# Patient Record
Sex: FEMALE | Race: AMERICAN INDIAN OR ALASKA NATIVE
[De-identification: names, ages, dates, MRNs, and addresses within clinical notes are randomized per-mention and may not be internally consistent; named-entity substitution may affect disease eponyms.]

---

## 2017-04-12 ENCOUNTER — HOSPITAL ENCOUNTER (OUTPATIENT)
Dept: HOSPITAL 5 - SPVWC | Age: 52
Discharge: HOME | End: 2017-04-12
Attending: OBSTETRICS & GYNECOLOGY
Payer: OTHER GOVERNMENT

## 2017-04-12 DIAGNOSIS — Z12.31: Primary | ICD-10-CM

## 2017-04-12 PROCEDURE — G0202 SCR MAMMO BI INCL CAD: HCPCS

## 2017-04-12 PROCEDURE — 77067 SCR MAMMO BI INCL CAD: CPT

## 2017-04-12 NOTE — MAMMOGRAPHY REPORT
BILATERAL DIGITAL SCREENING MAMMOGRAM with CAD :04/12/17 13:04:00



CLINICAL: Routine screening.



COMPARISON:03/29/16



FINDINGS: The breasts are heterogeneously dense and extremely dense, 

which lowers the sensitivity of mammography.  No mass, architectural 

distortion or suspicious calcifications.



IMPRESSION: No mammographic evidence of malignancy.



BI-RADS CATEGORY:  2 -- Benign





RECOMMENDATION: Routine mammographic screening in one year.





ACR BI-RADS MAMMOGRAPHIC CODES:

0 = Needs additional imaging evaluation; 1 = Negative; 2 = Benign; 3 = 

Probably benign; 4 = Suspicious; 5 = Malignant; 6 = Known biopsy-proven 

malignancy



COMMENT:

      1.   Dense breast tissue, i.e., adenosis, fibrocystic 

            changes, etc., may obscure an underlying neoplasm.

      2.   Approximately 10% of cancers are not detected with

            mammography.

      3.   A negative mammography report should not delay biopsy 

            if a clinically suspicious mass is present.



Patient follow-up letters are generated by our [employer] application.

## 2018-04-23 ENCOUNTER — HOSPITAL ENCOUNTER (OUTPATIENT)
Dept: HOSPITAL 5 - SPVWC | Age: 53
Discharge: HOME | End: 2018-04-23
Attending: OBSTETRICS & GYNECOLOGY
Payer: OTHER GOVERNMENT

## 2018-04-23 DIAGNOSIS — Z12.31: Primary | ICD-10-CM

## 2018-04-23 PROCEDURE — 77067 SCR MAMMO BI INCL CAD: CPT

## 2018-04-24 NOTE — MAMMOGRAPHY REPORT
BILATERAL DIGITAL SCREENING MAMMOGRAM with CAD: 04/23/18 09:46:00



CLINICAL: Routine screening.



COMPARISON:04/12/17



FINDINGS: The breasts are heterogeneously dense, which may obscure 

small masses.  A right inner asymmetry on the CC view requires 

additional imaging.No architectural distortion or suspicious 

calcifications.The left breast is negative.



IMPRESSION: Right asymmetry requiring further workup.



BI-RADS CATEGORY:  0 -- Additional Imaging Evaluation Required



RECOMMENDATION: Recall for right lateralmedial and spot magnification 

CC views and right breast ultrasound if needed.



ACR BI-RADS MAMMOGRAPHIC CODES:

0 = Needs additional imaging evaluation; 1 = Negative; 2 = Benign; 3 = 

Probably benign; 4 = Suspicious; 5 = Malignant; 6 = Known biopsy-proven 

malignancy



COMMENT:

      1.   Dense breast tissue, i.e., adenosis, fibrocystic 

            changes, etc., may obscure an underlying neoplasm.

      2.   Approximately 10% of cancers are not detected with

            mammography.

      3.   A negative mammography report should not delay biopsy 

            if a clinically suspicious mass is present.



COMMENT:

Patient follow-up letters are generated via our [employer] application.